# Patient Record
Sex: MALE | Race: WHITE | NOT HISPANIC OR LATINO | Employment: FULL TIME | URBAN - METROPOLITAN AREA
[De-identification: names, ages, dates, MRNs, and addresses within clinical notes are randomized per-mention and may not be internally consistent; named-entity substitution may affect disease eponyms.]

---

## 2024-03-25 ENCOUNTER — TELEPHONE (OUTPATIENT)
Dept: FAMILY MEDICINE CLINIC | Facility: CLINIC | Age: 49
End: 2024-03-25

## 2024-03-25 ENCOUNTER — OFFICE VISIT (OUTPATIENT)
Dept: FAMILY MEDICINE CLINIC | Facility: CLINIC | Age: 49
End: 2024-03-25
Payer: COMMERCIAL

## 2024-03-25 VITALS
SYSTOLIC BLOOD PRESSURE: 120 MMHG | WEIGHT: 186.6 LBS | BODY MASS INDEX: 28.28 KG/M2 | DIASTOLIC BLOOD PRESSURE: 72 MMHG | OXYGEN SATURATION: 99 % | TEMPERATURE: 98.7 F | HEART RATE: 83 BPM | HEIGHT: 68 IN

## 2024-03-25 DIAGNOSIS — J44.9 CHRONIC OBSTRUCTIVE PULMONARY DISEASE, UNSPECIFIED COPD TYPE: ICD-10-CM

## 2024-03-25 DIAGNOSIS — Z13.1 SCREENING FOR DIABETES MELLITUS: ICD-10-CM

## 2024-03-25 DIAGNOSIS — Z83.3 FAMILY HISTORY OF DIABETES MELLITUS IN FATHER: ICD-10-CM

## 2024-03-25 DIAGNOSIS — E78.5 HYPERLIPIDEMIA, UNSPECIFIED HYPERLIPIDEMIA TYPE: ICD-10-CM

## 2024-03-25 DIAGNOSIS — Z72.0 CURRENT NICOTINE VAPING ON SOME DAYS: ICD-10-CM

## 2024-03-25 DIAGNOSIS — Z11.59 NEED FOR HEPATITIS C SCREENING TEST: ICD-10-CM

## 2024-03-25 DIAGNOSIS — G47.33 OBSTRUCTIVE SLEEP APNEA SYNDROME: ICD-10-CM

## 2024-03-25 DIAGNOSIS — R20.0 NUMBNESS AND TINGLING OF LEFT THUMB: ICD-10-CM

## 2024-03-25 DIAGNOSIS — Z76.89 ENCOUNTER TO ESTABLISH CARE: Primary | ICD-10-CM

## 2024-03-25 DIAGNOSIS — R20.2 NUMBNESS AND TINGLING OF LEFT THUMB: ICD-10-CM

## 2024-03-25 DIAGNOSIS — R42 DIZZY SPELLS: ICD-10-CM

## 2024-03-25 DIAGNOSIS — I10 PRIMARY HYPERTENSION: ICD-10-CM

## 2024-03-25 DIAGNOSIS — K21.9 GASTROESOPHAGEAL REFLUX DISEASE WITHOUT ESOPHAGITIS: ICD-10-CM

## 2024-03-25 RX ORDER — OMEPRAZOLE 40 MG/1
40 CAPSULE, DELAYED RELEASE ORAL DAILY PRN
Qty: 90 CAPSULE | Refills: 1 | Status: SHIPPED | OUTPATIENT
Start: 2024-03-25

## 2024-03-25 RX ORDER — LISINOPRIL 40 MG/1
1 TABLET ORAL DAILY
COMMUNITY
Start: 2024-02-29 | End: 2024-03-25 | Stop reason: SDUPTHER

## 2024-03-25 RX ORDER — OMEPRAZOLE 40 MG/1
1 CAPSULE, DELAYED RELEASE ORAL DAILY
COMMUNITY
Start: 2023-12-05 | End: 2024-03-25 | Stop reason: SDUPTHER

## 2024-03-25 RX ORDER — METOPROLOL SUCCINATE 25 MG/1
2 TABLET, EXTENDED RELEASE ORAL DAILY
COMMUNITY
Start: 2024-02-29

## 2024-03-25 RX ORDER — AMLODIPINE BESYLATE 5 MG/1
1 TABLET ORAL DAILY
COMMUNITY
Start: 2024-02-29

## 2024-03-25 RX ORDER — LISINOPRIL 20 MG/1
20 TABLET ORAL DAILY
Qty: 90 TABLET | Refills: 1 | Status: SHIPPED | OUTPATIENT
Start: 2024-03-25

## 2024-03-25 RX ORDER — ALBUTEROL SULFATE 90 UG/1
2 AEROSOL, METERED RESPIRATORY (INHALATION) EVERY 4 HOURS PRN
Qty: 18 G | Refills: 2 | Status: SHIPPED | OUTPATIENT
Start: 2024-03-25

## 2024-03-25 NOTE — TELEPHONE ENCOUNTER
Called over to old PCP office and they stated patient will have to sign a records release form so we can obtain records. I have called and told patient and he stated he will do that one day this week.

## 2024-03-25 NOTE — PATIENT INSTRUCTIONS
Call office for lab results if you have not received a call from our office or [employer] message in 1-2 days.    Decrease lisinopril to 20mg po daily.  Only take omeprazole as needed.  Otherwise Continue current medications and treatment.   Will request records from prior pcp and review when/if available.  Take blood pressure twice daily, keep record of it, bringtoclinic at follow up.  Call clinic if systolic blood pressure consistently >140 or diastolic blood pressure consistently >90 please call or return to clinic.  GO to er if chest pain, shortness of breath, worsening dizziness.   Patient declines sleep medicine evaluation, denies snoring/difficulty sleeping/fatigue since losing weight.

## 2024-03-25 NOTE — PROGRESS NOTES
"Subjective        Hugo Mclain is a 48 y.o. male who presents to [facility].     Chief Complaint   Patient presents with    Establish Care       History of Present Illness    Angela presents to establish care with new provider.  This is my first time evaluating patient. He was previously seen by Carrie Hughse. I have requested records and will review when/if available.     Hypertension- stable - takes norvasc 5mg po daily and lisinopril 40mg po daily.  His blood pressure ranges 120/80 at home.  No chest pain, lower extremity edema, or shortness of breath. He will get dizzy if he stands quickly, didn't have this problem until he lost weight.  States he has lost around 70 pounds since experiencing divorce about 6 months ago.  He has not changed the dosage of any of his blood pressure medications in the interim.  A few times his blood pressure at home systolic was as low as 90.  He Hasn't seen cardiology.  In the past he thinks he has had an echocardiogram and a stress test but cannot be sure.  I do not see any record of this in the chart right now.  GERD- stable - previously took omeprazole 40mg po daily but ran out a month ago, hasn't had gerd symptoms since he lost weight.  No prior egd. Denies dysphagia.   Elevated Bmi - has lost about 70 pounds since his divorce about 6 months ago, is exercising on the treadmill every day.  He will use weight loss is because he is exercising and has changed his diet.  He does not always eat \"healthy food\" but is trying to do better. He hasn't been tested for diabetes.  His dad is diabetic.   Sleep apnea - had cpap in past, woke up ripping mask off and didn't tolerate it.  He tried a mouth piece and said it didn't help.  He is not interested in seeing a sleep medicine provider.  He thinks his symptoms are better since he has lost weight in the past 6 months.  Hyperlipidemia - no recent labs, was on meds in past but can't remember which medication.  "   COPD/asthma - not on meds - states was told he had asthma as a teenager, was told as adult he has copd but hasn't had pft and doesn't think he has copd. Was told to take trelegy in past but stopped because it was expensive. He does have albuterol hfa to use if needed, hasn't had to use this.  He is a former cigarette smoker.  He vapes occasionally.  He does smoke marijuana.  Left thumb numbness - occurs intermittently, works doing body work.  Is unsure of aggravating or alleviating factors.  Hasn't been diagnosed with carpal tunnel in past.     The following portions of the patient's history were reviewed and updated as appropriate: allergies, current medications, past family history, past medical history, past social history, past surgical history and problem list.    No Known Allergies       Current Outpatient Medications:     amLODIPine (NORVASC) 5 MG tablet, Take 1 tablet by mouth Daily., Disp: , Rfl:     lisinopril (PRINIVIL,ZESTRIL) 20 MG tablet, Take 1 tablet by mouth Daily., Disp: 90 tablet, Rfl: 1    metoprolol succinate XL (TOPROL-XL) 25 MG 24 hr tablet, Take 2 tablets by mouth Daily., Disp: , Rfl:     omeprazole (priLOSEC) 40 MG capsule, Take 1 capsule by mouth Daily As Needed (heartburn symptoms)., Disp: 90 capsule, Rfl: 1    albuterol sulfate  (90 Base) MCG/ACT inhaler, Inhale 2 puffs Every 4 (Four) Hours As Needed for Wheezing., Disp: 18 g, Rfl: 2    Review of Systems   Constitutional:  Negative for fatigue and fever.   HENT:  Negative for dental problem and trouble swallowing.    Respiratory:  Negative for cough, shortness of breath, wheezing and stridor.    Cardiovascular:  Negative for chest pain, palpitations and leg swelling.   Gastrointestinal:  Negative for abdominal pain, constipation, diarrhea, nausea and vomiting.   Genitourinary:  Negative for difficulty urinating and urgency.   Musculoskeletal:  Positive for arthralgias. Negative for myalgias.   Skin:  Negative for color change and  "pallor.   Neurological:  Positive for dizziness. Negative for tremors, seizures, syncope and headaches.   Psychiatric/Behavioral:  Negative for decreased concentration, sleep disturbance and suicidal ideas.         Objective     /72 (BP Location: Right arm, Patient Position: Sitting) Comment: manual  Pulse 83   Temp 98.7 °F (37.1 °C) (Temporal)   Ht 172.7 cm (68\")   Wt 84.6 kg (186 lb 9.6 oz)   SpO2 99%   BMI 28.37 kg/m²   BMI is >= 25 and <30. (Overweight) The following options were offered after discussion;: exercise counseling/recommendations     Physical Exam  Vitals and nursing note reviewed.   Constitutional:       General: He is not in acute distress.     Appearance: Normal appearance. He is not ill-appearing or diaphoretic.   HENT:      Head: Normocephalic and atraumatic.      Right Ear: Tympanic membrane, ear canal and external ear normal. There is no impacted cerumen.      Left Ear: Tympanic membrane, ear canal and external ear normal. There is no impacted cerumen.      Nose: Nose normal. No congestion.      Mouth/Throat:      Mouth: Mucous membranes are moist.      Pharynx: No oropharyngeal exudate.   Eyes:      General: No scleral icterus.     Conjunctiva/sclera: Conjunctivae normal.      Pupils: Pupils are equal, round, and reactive to light.   Cardiovascular:      Rate and Rhythm: Normal rate and regular rhythm.      Pulses: Normal pulses.      Heart sounds: Normal heart sounds.   Pulmonary:      Effort: Pulmonary effort is normal. No respiratory distress.      Breath sounds: Normal breath sounds. No wheezing.   Abdominal:      General: Bowel sounds are normal. There is no distension.      Palpations: Abdomen is soft.      Tenderness: There is no abdominal tenderness. There is no guarding.   Musculoskeletal:      Right lower leg: No edema.      Left lower leg: No edema.   Lymphadenopathy:      Cervical: No cervical adenopathy.   Skin:     General: Skin is warm and dry.      Capillary " Refill: Capillary refill takes less than 2 seconds.      Coloration: Skin is not jaundiced.      Findings: No bruising.   Neurological:      General: No focal deficit present.      Mental Status: He is alert and oriented to person, place, and time.      Gait: Gait normal.   Psychiatric:         Mood and Affect: Mood normal.         Behavior: Behavior normal.         PHQ-2 Depression Screening  Little interest or pleasure in doing things? 0-->not at all   Feeling down, depressed, or hopeless? 0-->not at all   PHQ-2 Total Score 0      Result Review           [facility] 11/2022 - negative        Assessment & Plan    Diagnoses and all orders for this visit:    1. Encounter to establish care (Primary)    2. Primary hypertension  -     Comprehensive Metabolic Panel; Future  -     Hemoglobin A1c; Future  -     CBC & Differential; Future  -     TSH Rfx On Abnormal To Free T4; Future  -     Urinalysis With Culture If Indicated -; Future  -     ECG 12 Lead; Future  -     lisinopril (PRINIVIL,ZESTRIL) 20 MG tablet; Take 1 tablet by mouth Daily.  Dispense: 90 tablet; Refill: 1  -     Adult Transthoracic Echo Complete W/ Cont if Necessary Per Protocol; Future    3. Hyperlipidemia, unspecified hyperlipidemia type  -     Comprehensive Metabolic Panel; Future  -     Hemoglobin A1c; Future  -     Lipid Panel; Future  -     CBC & Differential; Future    4. BMI 28.0-28.9,adult  -     Comprehensive Metabolic Panel; Future  -     CBC & Differential; Future    5. Gastroesophageal reflux disease without esophagitis  -     Comprehensive Metabolic Panel; Future  -     CBC & Differential; Future  -     omeprazole (priLOSEC) 40 MG capsule; Take 1 capsule by mouth Daily As Needed (heartburn symptoms).  Dispense: 90 capsule; Refill: 1    6. Obstructive sleep apnea syndrome    7. Dizzy spells  -     Comprehensive Metabolic Panel; Future  -     Hemoglobin A1c; Future  -     CBC & Differential; Future  -     Adult Transthoracic Echo Complete W/  Cont if Necessary Per Protocol; Future    8. Need for hepatitis C screening test  -     Hepatitis C Antibody; Future    9. Screening for diabetes mellitus  -     Hemoglobin A1c; Future    10. Family history of diabetes mellitus in father  -     Hemoglobin A1c; Future    11. Chronic obstructive pulmonary disease, unspecified COPD type  -     Complete PFT - Pre & Post Bronchodilator; Future  -     albuterol sulfate  (90 Base) MCG/ACT inhaler; Inhale 2 puffs Every 4 (Four) Hours As Needed for Wheezing.  Dispense: 18 g; Refill: 2    12. Current nicotine vaping on some days    13. Numbness and tingling of left thumb       Patient Instructions   Call office for lab results if you have not received a call from our office or [employer] message in 1-2 days.    Decrease lisinopril to 20mg po daily.  Only take omeprazole as needed.  Otherwise Continue current medications and treatment.   Will request records from prior pcp and review when/if available.  Take blood pressure twice daily, keep record of it, bringtoclinic at follow up.  Call clinic if systolic blood pressure consistently >140 or diastolic blood pressure consistently >90 please call or return to clinic.  GO to er if chest pain, shortness of breath, worsening dizziness.   Patient declines sleep medicine evaluation, denies snoring/difficulty sleeping/fatigue since losing weight.   Advised patient to go to pharmacy for tetanus vaccine.   Advised to avoid vaping.  Will calculate 10-year ASCVD risk when labs are available, may need to start cholesterol medication.      Follow Up   Return in about 8 weeks (around 5/20/2024) for Annual physical, Recheck, Hypertension.    Patient was given instructions and counseling regarding his condition or for health maintenance advice. Please see specific information pulled into the AVS if appropriate.     Anusha Arias, AIXA     03/25/24